# Patient Record
Sex: MALE | Race: WHITE | URBAN - METROPOLITAN AREA
[De-identification: names, ages, dates, MRNs, and addresses within clinical notes are randomized per-mention and may not be internally consistent; named-entity substitution may affect disease eponyms.]

---

## 2017-12-14 ENCOUNTER — RADIANT APPOINTMENT (OUTPATIENT)
Dept: GENERAL RADIOLOGY | Facility: CLINIC | Age: 47
End: 2017-12-14
Attending: FAMILY MEDICINE
Payer: COMMERCIAL

## 2017-12-14 ENCOUNTER — OFFICE VISIT (OUTPATIENT)
Dept: ORTHOPEDICS | Facility: CLINIC | Age: 47
End: 2017-12-14
Payer: COMMERCIAL

## 2017-12-14 ENCOUNTER — NURSE TRIAGE (OUTPATIENT)
Dept: NURSING | Facility: CLINIC | Age: 47
End: 2017-12-14

## 2017-12-14 ENCOUNTER — OFFICE VISIT (OUTPATIENT)
Dept: URGENT CARE | Facility: URGENT CARE | Age: 47
End: 2017-12-14
Payer: COMMERCIAL

## 2017-12-14 VITALS
DIASTOLIC BLOOD PRESSURE: 79 MMHG | OXYGEN SATURATION: 98 % | HEART RATE: 44 BPM | WEIGHT: 190 LBS | TEMPERATURE: 98.3 F | SYSTOLIC BLOOD PRESSURE: 153 MMHG

## 2017-12-14 VITALS — HEIGHT: 70 IN | BODY MASS INDEX: 27.2 KG/M2 | HEART RATE: 66 BPM | WEIGHT: 190 LBS

## 2017-12-14 DIAGNOSIS — S49.91XA SHOULDER INJURY, RIGHT, INITIAL ENCOUNTER: ICD-10-CM

## 2017-12-14 DIAGNOSIS — S42.024A CLOSED NONDISPLACED FRACTURE OF SHAFT OF RIGHT CLAVICLE, INITIAL ENCOUNTER: Primary | ICD-10-CM

## 2017-12-14 DIAGNOSIS — S42.031A CLOSED DISPLACED FRACTURE OF ACROMIAL END OF RIGHT CLAVICLE, INITIAL ENCOUNTER: Primary | ICD-10-CM

## 2017-12-14 PROCEDURE — 99203 OFFICE O/P NEW LOW 30 MIN: CPT | Performed by: FAMILY MEDICINE

## 2017-12-14 PROCEDURE — 99203 OFFICE O/P NEW LOW 30 MIN: CPT | Performed by: ORTHOPAEDIC SURGERY

## 2017-12-14 PROCEDURE — 73000 X-RAY EXAM OF COLLAR BONE: CPT | Mod: RT

## 2017-12-14 PROCEDURE — 73030 X-RAY EXAM OF SHOULDER: CPT | Mod: RT

## 2017-12-14 RX ORDER — HYDROCODONE BITARTRATE AND ACETAMINOPHEN 5; 325 MG/1; MG/1
1-2 TABLET ORAL EVERY 6 HOURS PRN
Qty: 20 TABLET | Refills: 0 | Status: SHIPPED | OUTPATIENT
Start: 2017-12-14 | End: 2018-02-07

## 2017-12-14 RX ORDER — HYDROMORPHONE HYDROCHLORIDE 2 MG/1
2-4 TABLET ORAL EVERY 6 HOURS PRN
Qty: 40 TABLET | Refills: 0 | Status: SHIPPED | OUTPATIENT
Start: 2017-12-14 | End: 2018-02-07

## 2017-12-14 RX ORDER — IBUPROFEN 800 MG/1
800 TABLET, FILM COATED ORAL EVERY 8 HOURS PRN
Qty: 60 TABLET | Refills: 0 | Status: SHIPPED | OUTPATIENT
Start: 2017-12-14

## 2017-12-14 NOTE — MR AVS SNAPSHOT
"              After Visit Summary   2017    Enoch Fortune Sr.    MRN: [id number]           Patient Information     Date Of Birth          1970        Visit Information        Provider Department      2017 3:40 PM Alfonzo Alvarez MD [facility]        Today's Diagnoses     Closed nondisplaced fracture of shaft of right clavicle, initial encounter    -  1       Follow-ups after your visit        Who to contact     If you have questions or need follow up information about today's clinic visit or your schedule please contact [facility] directly at [phone].  Normal or non-critical lab and imaging results will be communicated to you by MessageMehart, letter or phone within 4 business days after the clinic has received the results. If you do not hear from us within 7 days, please contact the clinic through Sandglazt or phone. If you have a critical or abnormal lab result, we will notify you by phone as soon as possible.  Submit refill requests through [employer] or call your pharmacy and they will forward the refill request to us. Please allow 3 business days for your refill to be completed.          Additional Information About Your Visit        MyChart Information     [employer] lets you send messages to your doctor, view your test results, renew your prescriptions, schedule appointments and more. To sign up, go to [email or web address] . Click on \"Log in\" on the left side of the screen, which will take you to the Welcome page. Then click on \"Sign up Now\" on the right side of the page.     You will be asked to enter the access code listed below, as well as some personal information. Please follow the directions to create your username and password.     Your access code is: [email or web address]  Expires: 3/14/2018  2:07 PM     Your access code will  in 90 days. If you need help or a new code, please call your [facility] clinic or [phone].        Care " "EveryWhere ID     This is your Care EveryWhere ID. This could be used by other organizations to access your [facility] medical records  [id number]        Your Vitals Were     Pulse Height BMI (Body Mass Index)             66 5' 10\" (1.778 m) 27.26 kg/m2          Blood Pressure from Last 3 Encounters:   12/14/17 153/79    Weight from Last 3 Encounters:   12/14/17 190 lb (86.2 kg)   12/14/17 190 lb (86.2 kg)              Today, you had the following     No orders found for display         Today's Medication Changes          These changes are accurate as of: 12/14/17 11:59 PM.  If you have any questions, ask your nurse or doctor.               Start taking these medicines.        Dose/Directions    HYDROcodone-acetaminophen 5-325 MG per tablet   Commonly known as:  NORCO   Used for:  Closed displaced fracture of acromial end of right clavicle, initial encounter   Started by:  Trevor Moon MD        Dose:  1-2 tablet   Take 1-2 tablets by mouth every 6 hours as needed for moderate to severe pain   Quantity:  20 tablet   Refills:  0       HYDROmorphone 2 MG tablet   Commonly known as:  DILAUDID   Used for:  Closed nondisplaced fracture of shaft of right clavicle, initial encounter   Started by:  Alfonzo Alvarez MD        Dose:  2-4 mg   Take 1-2 tablets (2-4 mg) by mouth every 6 hours as needed for pain maximum 8 tablet(s) per day   Quantity:  40 tablet   Refills:  0       ibuprofen 800 MG tablet   Commonly known as:  ADVIL/MOTRIN   Used for:  Closed displaced fracture of acromial end of right clavicle, initial encounter   Started by:  Trevor Moon MD        Dose:  800 mg   Take 1 tablet (800 mg) by mouth every 8 hours as needed for moderate pain   Quantity:  60 tablet   Refills:  0            Where to get your medicines      These medications were sent to [address] [address] [facility]  [address] [facility] [address]    Hours:  Tech issues with their phone system Phone:  " [phone]     ibuprofen 800 MG tablet         Some of these will need a paper prescription and others can be bought over the counter.  Ask your nurse if you have questions.     Bring a paper prescription for each of these medications     HYDROcodone-acetaminophen 5-325 MG per tablet    HYDROmorphone 2 MG tablet                Primary Care Provider Office Phone # Fax #    [facility] [phone] [address] [facility] [address]        Equal Access to Services     ZION SANDHU : Hadii aad ku hadasho Soomaali, waaxda luqadaha, qaybta kaalmada adeegyada, waxay idiin hayaan adeeg todd pinto. So [facility] [phone].    ATENCIÓN: Si habla español, tiene a mazariegos disposición servicios gratuitos de asistencia lingüística. [facility] [phone].    We comply with applicable federal civil rights laws and [place] laws. We do not discriminate on the basis of race, color, national origin, age, disability, sex, sexual orientation, or gender identity.            Thank you!     Thank you for choosing [facility]  for your care. Our goal is always to provide you with excellent care. Hearing back from our patients is one way we can continue to improve our services. Please take a few minutes to complete the written survey that you may receive in the mail after your visit with us. Thank you!             Your Updated Medication List - Protect others around you: Learn how to safely use, store and throw away your medicines at www.disposemymeds.org.          This list is accurate as of: 12/14/17 11:59 PM.  Always use your most recent med list.                   Brand Name Dispense Instructions for use Diagnosis    HYDROcodone-acetaminophen 5-325 MG per tablet    NORCO    20 tablet    Take 1-2 tablets by mouth every 6 hours as needed for moderate to severe pain    Closed displaced fracture of acromial end of right clavicle, initial encounter       HYDROmorphone 2 MG  tablet    DILAUDID    40 tablet    Take 1-2 tablets (2-4 mg) by mouth every 6 hours as needed for pain maximum 8 tablet(s) per day    Closed nondisplaced fracture of shaft of right clavicle, initial encounter       ibuprofen 800 MG tablet    ADVIL/MOTRIN    60 tablet    Take 1 tablet (800 mg) by mouth every 8 hours as needed for moderate pain    Closed displaced fracture of acromial end of right clavicle, initial encounter

## 2017-12-14 NOTE — PATIENT INSTRUCTIONS
Okay to take ibuprofen 800 mg every 8 hours as needed.  Okay to take norco for worse pain.  Follow up with sports ortho for fracture care.  Wear sling for comfort.  Ice to area.      Collarbone Fracture  You have a break (fracture) in your collarbone (clavicle). This will cause swelling, pain, and bruising. The first few weeks will be the most painful. This is because deep breathing, coughing, or changing position from sitting to lying down may cause the broken ends to move slightly.   The fracture will heal in about 4 to 6 weeks. Most people can return to normal activities in about 3 months. In children, this injury will heal by reshaping the bone back to normal. In adults, a noticeable bump in the bone may remain.  Treatment is with a sling or a special type of arm sling called a shoulder immobilizer. This supports your arm and eases pain.  Home care  Follow these guidelines when caring for yourself or your child at home:    Put an ice pack on the injured area. Do this for 20 minutes every 1 to 2 hours on the first day. You can make an ice pack by wrapping a plastic bag of ice cubes in a thin towel. Keep using the ice pack 3 to 4 times a day for the next 2 days. Then use it as needed to ease pain and swelling.    If you were given a sling or shoulder immobilizer, wear it for comfort. You may take it off when you bathe or sleep. Take your arm out of the sling for a little while each day and move your shoulder, elbow, wrist, and hand to keep them from getting stiff.    Don t do any heavy lifting or raise the injured arm overhead until you are pain-free. Your child shouldn t play sports or do physical education class for at least 4 weeks, or until the healthcare provider says it s OK to do so.    You may use acetaminophen or ibuprofen to control pain, unless another pain medicine was prescribed. If you have chronic liver or kidney disease, talk with your healthcare provider before using these medicines. Also talk  with your provider if you ve had a stomach ulcer or gastrointestinal bleeding.    Your doctor may refer you to physical therapy for shoulder exercises once it starts to heal.    You may need surgery if the bones are out of place (displaced). Surgery will put them in better alignment while they heal. This leads to better strength when you have healed.  Follow-up care  Follow up with your healthcare provider within 1 week, or as advised. This is to be sure the bone is healing the way it should.  X-rays are occasionally taken of the fracture. You will be told of any new findings that may affect your care.  When to seek medical advice  Call your healthcare provider right away if any of these occur:    Swelling in your collarbone gets worse or the skin in the area becomes pale or discolored    Large area of bruising over the collarbone    Fingers become swollen, cold, blue, numb, or tingly    Shortness of breath, dizziness, or general weakness    Weakness or swelling in your arm    Any redness, drainage, or pus coming from the wound  Date Last Reviewed: 1/1/2017 2000-2017 [employer]. [address] [place] [id number]. All rights reserved. This information is not intended as a substitute for professional medical care. Always follow your healthcare professional's instructions.

## 2017-12-14 NOTE — PROGRESS NOTES
HISTORY OF PRESENT ILLNESS:    Enoch Fortune Sr. is a 47 year old RHD male who is seen in consultation at the request of Dr. Moon for right shoulder pain from an injury sustained skiing at [facility] when he fell directly on the tip of the shoulder He went to urgent care where xrays taken showed a fractured clavicle, he was placed in a sling and referred here.    Present symptoms: superior shoulder dull achy pain. Pain scale - 7-8/10 max, 3/10 currently while on Norco  Treatments tried to this point: see above  Orthopedic PMH: None     No past medical history on file.    No past surgical history on file.    No family history on file.    Social History     Social History     Marital status:      Spouse name: N/A     Number of children: N/A     Years of education: N/A     Occupational History     Not on file.     Social History Main Topics     Smoking status: Never Smoker     Smokeless tobacco: Never Used     Alcohol use Not on file     Drug use: Not on file     Sexual activity: Not on file     Other Topics Concern     Not on file     Social History Narrative       Current Outpatient Prescriptions   Medication Sig Dispense Refill     HYDROcodone-acetaminophen (NORCO) 5-325 MG per tablet Take 1-2 tablets by mouth every 6 hours as needed for moderate to severe pain 20 tablet 0     ibuprofen (ADVIL/MOTRIN) 800 MG tablet Take 1 tablet (800 mg) by mouth every 8 hours as needed for moderate pain 60 tablet 0       No Known Allergies    REVIEW OF SYSTEMS:  CONSTITUTIONAL:  NEGATIVE for fever, chills, change in weight  INTEGUMENTARY/SKIN:  NEGATIVE for worrisome rashes, moles or lesions  EYES:  NEGATIVE for vision changes or irritation  ENT/MOUTH:  NEGATIVE for ear, mouth and throat problems  RESP:  NEGATIVE for significant cough or SOB  BREAST:  NEGATIVE for masses, tenderness or discharge  CV:  NEGATIVE for chest pain, palpitations or peripheral edema  GI:  NEGATIVE for nausea, abdominal pain, heartburn, or change in  "bowel habits  :  Negative   MUSCULOSKELETAL:  See HPI above  NEURO:  NEGATIVE for weakness, dizziness or paresthesias  ENDOCRINE:  NEGATIVE for temperature intolerance, skin/hair changes  HEME/ALLERGY/IMMUNE:  NEGATIVE for bleeding problems  PSYCHIATRIC:  NEGATIVE for changes in mood or affect      PHYSICAL EXAM:  Pulse 66  Ht 5' 10\" (1.778 m)  Wt 190 lb (86.2 kg)  BMI 27.26 kg/m2  Body mass index is 27.26 kg/(m^2).   GENERAL APPEARANCE: healthy, alert and no distress   SKIN: no suspicious lesions or rashes  NEURO: Normal strength and tone, mentation intact and speech normal  VASCULAR: Good pulses, and capillary refill   LYMPH: no lymphadenopathy   PSYCH:  mentation appears normal and affect normal/bright    MSK:  Examination of the neck and shoulder girdle musculature reveals no asymmetry, or atrophy to the muscle masses.  There is no erythema, ecchymosis or edema.  There is a normal lordosis to the cervical and lumbar spine regions.  There is a normal kyphosis to the thoracic spine.  There is no clinical evidence of scoliosis. There is no tenderness to palpation or percussion.  There is no paraspinal muscle spasm present.  There is a Normal range of motion to the cervical spine. Forward flexion to 45, extension to 55, R and L lateral bending to 45, and rotation to 70, both R and L.    Strength : Bicep 5/5   C5-6       Sensation :     Deltoid 5/5   C5    Lateral Arm    Wrist extensors 5/5  C6    Thumb    Tricep  5/5   C7    Middle Finger    Finger flexors  5/5  C8    Little Finger    Interossei  5/5   T1    Medial Arm    Reflexes :  Bicep   Symmetric  C5-6    BR Symmetric  C6    Tricep Symmetric  C7    Shoulder:  Examination of the right shoulder reveals a partial active ROM and partial passive ROM.      There is  tenderness to palpation about the AC joint.  There is no tenderness to palpation in the subacromial space.  There is give-way rotator cuff weakness.  Speed's an Chris's Tests for Bicep pathology " are negative. Arm adduction does not cause pain in the AC joint.  Apprehension sign is negative. Scapular winging is not present. ROM of the elbow and wrist is normal and CMS is intact to fingertips.                 ASSESSMENT / PLAN: Right clavicle fracture, treatment for this will be symptomatic. If he still has pain in 2-3 weeks he'll return and we'll recheck the x-ray.        Imaging Interpretation:         Jose Alvarez MD  Department of Orthopedic Surgery

## 2017-12-14 NOTE — MR AVS SNAPSHOT
After Visit Summary   12/14/2017    Enoch Fortune Sr.    MRN: [id number]           Patient Information     Date Of Birth          1970        Visit Information        Provider Department      12/14/2017 1:20 PM Trevor Moon MD [facility] Urgent Care        Today's Diagnoses     Closed displaced fracture of acromial end of right clavicle, initial encounter    -  1    Shoulder injury, right, initial encounter          Care Instructions    Okay to take ibuprofen 800 mg every 8 hours as needed.  Okay to take norco for worse pain.  Follow up with sports ortho for fracture care.  Wear sling for comfort.  Ice to area.      Collarbone Fracture  You have a break (fracture) in your collarbone (clavicle). This will cause swelling, pain, and bruising. The first few weeks will be the most painful. This is because deep breathing, coughing, or changing position from sitting to lying down may cause the broken ends to move slightly.   The fracture will heal in about 4 to 6 weeks. Most people can return to normal activities in about 3 months. In children, this injury will heal by reshaping the bone back to normal. In adults, a noticeable bump in the bone may remain.  Treatment is with a sling or a special type of arm sling called a shoulder immobilizer. This supports your arm and eases pain.  Home care  Follow these guidelines when caring for yourself or your child at home:    Put an ice pack on the injured area. Do this for 20 minutes every 1 to 2 hours on the first day. You can make an ice pack by wrapping a plastic bag of ice cubes in a thin towel. Keep using the ice pack 3 to 4 times a day for the next 2 days. Then use it as needed to ease pain and swelling.    If you were given a sling or shoulder immobilizer, wear it for comfort. You may take it off when you bathe or sleep. Take your arm out of the sling for a little while each day and move your shoulder, elbow, wrist, and hand to keep them from getting  stiff.    Don t do any heavy lifting or raise the injured arm overhead until you are pain-free. Your child shouldn t play sports or do physical education class for at least 4 weeks, or until the healthcare provider says it s OK to do so.    You may use acetaminophen or ibuprofen to control pain, unless another pain medicine was prescribed. If you have chronic liver or kidney disease, talk with your healthcare provider before using these medicines. Also talk with your provider if you ve had a stomach ulcer or gastrointestinal bleeding.    Your doctor may refer you to physical therapy for shoulder exercises once it starts to heal.    You may need surgery if the bones are out of place (displaced). Surgery will put them in better alignment while they heal. This leads to better strength when you have healed.  Follow-up care  Follow up with your healthcare provider within 1 week, or as advised. This is to be sure the bone is healing the way it should.  X-rays are occasionally taken of the fracture. You will be told of any new findings that may affect your care.  When to seek medical advice  Call your healthcare provider right away if any of these occur:    Swelling in your collarbone gets worse or the skin in the area becomes pale or discolored    Large area of bruising over the collarbone    Fingers become swollen, cold, blue, numb, or tingly    Shortness of breath, dizziness, or general weakness    Weakness or swelling in your arm    Any redness, drainage, or pus coming from the wound  Date Last Reviewed: 1/1/2017 2000-2017 [employer]. [address] [place] [address]. All rights reserved. This information is not intended as a substitute for professional medical care. Always follow your healthcare professional's instructions.                Follow-ups after your visit        Additional Services     [facility] is referring you to the Orthopedic  " Services at [facility].       The  Representative will assist you in the coordination of your Orthopedic and Musculoskeletal Care as prescribed by your physician.    The  Representative will call you within 1 business day to help schedule your appointment, or you may contact the  Representative at:    All areas ~ [phone]     Type of Referral : Non Surgical       Timeframe requested: 1 - 2 days    Coverage of these services is subject to the terms and limitations of your health insurance plan.  Please call member services at your health plan with any benefit or coverage questions.      If X-rays, CT or MRI's have been performed, please contact the facility where they were done to arrange for , prior to your scheduled appointment.  Please bring this referral request to your appointment and present it to your specialist.                  Who to contact     If you have questions or need follow up information about today's clinic visit or your schedule please contact [facility] URGENT CARE directly at [phone].  Normal or non-critical lab and imaging results will be communicated to you by [employer]hart, letter or phone within 4 business days after the clinic has received the results. If you do not hear from us within 7 days, please contact the clinic through [employer] or phone. If you have a critical or abnormal lab result, we will notify you by phone as soon as possible.  Submit refill requests through [employer] or call your pharmacy and they will forward the refill request to us. Please allow 3 business days for your refill to be completed.          Additional Information About Your Visit        [employer] Information     [employer] lets you send messages to your doctor, view your test results, renew your prescriptions, schedule appointments and more. To sign up, go to [email or web address] . Click on \"Log in\" on the left side of the screen, which " "will take you to the Welcome page. Then click on \"Sign up Now\" on the right side of the page.     You will be asked to enter the access code listed below, as well as some personal information. Please follow the directions to create your username and password.     Your access code is: [email or web address]  Expires: 3/14/2018  2:07 PM     Your access code will  in 90 days. If you need help or a new code, please call your [place] clinic or [phone].        Care EveryWhere ID     This is your Care EveryWhere ID. This could be used by other organizations to access your [place] medical records  [id number]        Your Vitals Were     Pulse Temperature Pulse Oximetry             44 98.3  F (36.8  C) (Oral) 98%          Blood Pressure from Last 3 Encounters:   17 153/79    Weight from Last 3 Encounters:   17 190 lb (86.2 kg)              We Performed the Following     ORTHO  REFERRAL          Today's Medication Changes          These changes are accurate as of: 17  2:07 PM.  If you have any questions, ask your nurse or doctor.               Start taking these medicines.        Dose/Directions    HYDROcodone-acetaminophen 5-325 MG per tablet   Commonly known as:  NORCO   Used for:  Closed displaced fracture of acromial end of right clavicle, initial encounter   Started by:  Trevor Moon MD        Dose:  1-2 tablet   Take 1-2 tablets by mouth every 6 hours as needed for moderate to severe pain   Quantity:  20 tablet   Refills:  0       ibuprofen 800 MG tablet   Commonly known as:  ADVIL/MOTRIN   Used for:  Closed displaced fracture of acromial end of right clavicle, initial encounter   Started by:  Trevor Moon MD        Dose:  800 mg   Take 1 tablet (800 mg) by mouth every 8 hours as needed for moderate pain   Quantity:  60 tablet   Refills:  0            Where to get your medicines      These medications were sent to [place] [address] [place] [address] " [place], [facility] [address]    Hours:  Tech issues with their phone system Phone:  [phone]     ibuprofen 800 MG tablet         Some of these will need a paper prescription and others can be bought over the counter.  Ask your nurse if you have questions.     Bring a paper prescription for each of these medications     HYDROcodone-acetaminophen 5-325 MG per tablet                Primary Care Provider Office Phone # Fax #    Kimberli Sexton [facility] [phone] [address] [facility] [address]        Equal Access to Services     ZION SANDHU : Hadii sonia ku hadasho Soomaali, waaxda luqadaha, qaybta kaalmada adeegyada, waxay idiin haycricketn al pinto. So [facility] [phone].    ATENCIÓN: Si habla español, tiene a mazariegos disposición servicios gratuitos de asistencia lingüística. Llame al [phone].    We comply with applicable federal civil rights laws and [place] laws. We do not discriminate on the basis of race, color, national origin, age, disability, sex, sexual orientation, or gender identity.            Thank you!     Thank you for choosing [facility] URGENT CARE  for your care. Our goal is always to provide you with excellent care. Hearing back from our patients is one way we can continue to improve our services. Please take a few minutes to complete the written survey that you may receive in the mail after your visit with us. Thank you!             Your Updated Medication List - Protect others around you: Learn how to safely use, store and throw away your medicines at www.disposemymeds.org.          This list is accurate as of: 12/14/17  2:07 PM.  Always use your most recent med list.                   Brand Name Dispense Instructions for use Diagnosis    HYDROcodone-acetaminophen 5-325 MG per tablet    NORCO    20 tablet    Take 1-2 tablets by mouth every 6 hours as needed for moderate to severe pain    Closed displaced fracture of acromial end of right clavicle,  initial encounter       ibuprofen 800 MG tablet    ADVIL/MOTRIN    60 tablet    Take 1 tablet (800 mg) by mouth every 8 hours as needed for moderate pain    Closed displaced fracture of acromial end of right clavicle, initial encounter

## 2017-12-14 NOTE — PROGRESS NOTES
SUBJECTIVE:  Chief Complaint   Patient presents with     Urgent Care     Shoulder Injury     Pt states fell during skiing today  and hurt right shoulder.      Enoch Fortune Sr. is a 47 year old male presents with a chief complaint of right shoulder pain and decreased range of motion.  The injury occurred 1 hours(s) ago.   The injury happened while skiing at [facility]. How: fall, thinks hit a bump and tumbled, ended up falling forward, hitting right shoulder, developed immediate pain.  No head injury, no LOC.   bandaged and made make shift sling, ice to shoulder and took Ibuprofen.  The patient complained of moderate pain  and has had decreased ROM.  Pain exacerbated by movement.  Relieved by nothing.  He treated it initially with ice and Ibuprofen. This is the first time this type of injury has occurred to this patient.     No routine primary provider.  Overall healthy, no chronic medications.  No prior injuries to right arm/shoulder.    No past medical history on file.  No current outpatient prescriptions on file.     Social History   Substance Use Topics     Smoking status: Never Smoker     Smokeless tobacco: Never Used     Alcohol use Not on file       ROS:  CONSTITUTIONAL:NEGATIVE for fever, chills, change in weight  INTEGUMENTARY/SKIN: NEGATIVE for worrisome rashes, moles or lesions  ENT/MOUTH: NEGATIVE for ear, mouth and throat problems  RESP:NEGATIVE for significant cough or SOB  CV: NEGATIVE for chest pain, palpitations or peripheral edema  GI: NEGATIVE for nausea, abdominal pain, heartburn, or change in bowel habits  MUSCULOSKELETAL: POSITIVE  for injury to right shoulder    EXAM:   /79 (BP Location: Left arm, Patient Position: Chair, Cuff Size: Adult Large)  Pulse (!) 44  Temp 98.3  F (36.8  C) (Oral)  Wt 190 lb (86.2 kg)  SpO2 98%  Gen: healthy, alert and mild distress  Extremity: right shoulder has generalized tenderness and decreased ROM, mild discomfort on clavicle distal.   There  is not compromise to the distal circulation.  Pulses are +2 and CRT is brisk  EXTREMITIES: peripheral pulses normal  SKIN: no suspicious lesions or rashes  NEURO: Normal strength and tone, sensory exam grossly normal, mentation intact and speech normal    X-RAY was done - right shoulder and right clavicle -  clavicle with fracture, humerus intact    ASSESSMENT/PLAN:   (S42.031A) Closed displaced fracture of acromial end of right clavicle, initial encounter  (primary encounter diagnosis)  Plan: HYDROcodone-acetaminophen (NORCO) 5-325 MG per         tablet, ibuprofen (ADVIL/MOTRIN) 800 MG tablet,        ORTHO  REFERRAL            (S49.91XA) Shoulder injury, right, initial encounter  Comment: clavicle fracture  Plan: XR Shoulder Right G/E 3 Views, XR Clavicle         Right            Sling for comfort, reviewed symptomatic treatment with rest, ice.  RX ibuprofen 800 mg, RX norco 5/325 mg #20 given for worse pain.  Referral to [facility] for further fracture care.    Return to clinic if any further questions or concerns.    Trevor Moon MD  December 14, 2017 3:08 PM

## 2017-12-14 NOTE — NURSING NOTE
Chief Complaint   Patient presents with     Urgent Care     Shoulder Injury     Pt states fell during skiing today  and hurt right shoulder.        Initial /79 (BP Location: Left arm, Patient Position: Chair, Cuff Size: Adult Large)  Pulse (!) 44  Temp 98.3  F (36.8  C) (Oral)  Wt 190 lb (86.2 kg)  SpO2 98% There is no height or weight on file to calculate BMI.  Medication Reconciliation: unable or not appropriate to perform   Maranda Fitzgerald CMA (SHIRLEY) 12/14/2017 1:35 PM

## 2017-12-15 NOTE — TELEPHONE ENCOUNTER
Additional Information    Pharmacy calling with prescription question and triager answers question    Protocols used: MEDICATION QUESTION CALL-ADULT-  Pharmacy calling to check on the pain medication prescription Dilaudid.  Prescribing MD's notes read to pharmacist.  Ibis Chambers RN  [facility]

## 2018-02-07 ENCOUNTER — OFFICE VISIT (OUTPATIENT)
Dept: ORTHOPEDICS | Facility: CLINIC | Age: 48
End: 2018-02-07
Payer: COMMERCIAL

## 2018-02-07 VITALS
SYSTOLIC BLOOD PRESSURE: 128 MMHG | HEIGHT: 69 IN | WEIGHT: 178 LBS | DIASTOLIC BLOOD PRESSURE: 70 MMHG | BODY MASS INDEX: 26.36 KG/M2

## 2018-02-07 DIAGNOSIS — S42.021D CLOSED DISPLACED FRACTURE OF SHAFT OF RIGHT CLAVICLE WITH ROUTINE HEALING, SUBSEQUENT ENCOUNTER: Primary | ICD-10-CM

## 2018-02-07 PROCEDURE — 99213 OFFICE O/P EST LOW 20 MIN: CPT | Performed by: ORTHOPAEDIC SURGERY

## 2018-02-07 NOTE — PROGRESS NOTES
"HISTORY OF PRESENT ILLNESS:    Enoch Fortune Sr. is a 47 year old male who is seen in follow up for right clavicle fracture.  Present symptoms: DOI: 12/14/2017 ~ 8 weeks ago.  Pt states he continues to have some pain with the shoulder and some limitations with his ROM.  Treatments tried to this point: ibuprofen, norco, dilaudid, ice, stretching    PHYSICAL EXAM:  /70 (BP Location: Right arm, Patient Position: Sitting, Cuff Size: Adult Regular)  Ht 5' 9.1\" (1.755 m)  Wt 178 lb (80.7 kg)  BMI 26.21 kg/m2  Body mass index is 26.21 kg/(m^2).   GENERAL APPEARANCE: healthy, alert and no distress   SKIN: no suspicious lesions or rashes  NEURO: Normal strength and tone, mentation intact and speech normal  VASCULAR:  good pulses, and cappillary refill   LYMPH: no lymphadenopathy   PSYCH:  mentation appears normal and affect normal/bright    MSK:  Examination of the neck and shoulder girdle musculature reveals no asymmetry, or atrophy to the muscle masses.  There is no erythema, ecchymosis or edema.  There is a normal lordosis to the cervical and lumbar spine regions.  There is a normal kyphosis to the thoracic spine.  There is no clinical evidence of scoliosis. There is no tenderness to palpation or percussion.  There is no paraspinal muscle spasm present.  There is a Normal range of motion to the cervical spine. Forward flexion to 45, extension to 55, R and L lateral bending to 45, and rotation to 70, both R and L.    Strength : Bicep 5/5   C5-6       Sensation :     Deltoid 5/5   C5    Lateral Arm    Wrist extensors 5/5  C6    Thumb    Tricep  5/5   C7    Middle Finger    Finger flexors  5/5  C8    Little Finger    Interossei  5/5   T1    Medial Arm    Reflexes :  Bicep   Symmetric  C5-6    BR Symmetric  C6    Tricep Symmetric  C7    Shoulder:  Examination of the right shoulder reveals a partial active ROM and full passive ROM.    Forward Flexion : 180 degrees Pain  YES --   Abduction  :  180 degrees  YES -- "   Internal Rotation : lumbar 5 YES --  Subscap Lift-off test negative  External Rotation :    0 Deg-90  90 Deg- 90 Contralateral side symmetric    There is no tenderness to palpation about the AC joint, anterior capsule or Bicep tendon.  There is no tenderness to palpation in the subacromial space.  There is give-way rotator cuff weakness.  Speed's an Yergason's Tests for Bicep pathology are negative. Arm adduction does not cause pain in the AC joint.  Apprehension sign is negative. Scapular winging is not present. ROM of the elbow and wrist is normal and CMS is intact to fingertips.        IMAGING INTERPRETATION:       ASSESSMENT / PLAN: Healing right midshaft clavicle fracture. I told him that at this point the healing is going to be slow for the next 4-6 weeks. He is going to maintain range of motion of the shoulder.    Return to clinic keiry Alvarez MD  Dept. Orthopedic Surgery  [facility]       Disclaimer: This note consists of symbols derived from keyboarding, dictation and/or voice recognition software. As a result, there may be errors in the script that have gone undetected. Please consider this when interpreting information found in this chart.

## 2018-02-07 NOTE — NURSING NOTE
"Chief Complaint   Patient presents with     Follow Up For     Right clavicle fracture - DOI: 12/14/2017       Initial /70 (BP Location: Right arm, Patient Position: Sitting, Cuff Size: Adult Regular)  Ht 5' 9.1\" (1.755 m)  Wt 178 lb (80.7 kg)  BMI 26.21 kg/m2 Estimated body mass index is 26.21 kg/(m^2) as calculated from the following:    Height as of this encounter: 5' 9.1\" (1.755 m).    Weight as of this encounter: 178 lb (80.7 kg).  Medication Reconciliation: complete    "

## 2018-02-07 NOTE — MR AVS SNAPSHOT
After Visit Summary   2/7/2018    Enoch Fortune Sr.    MRN: [id number]           Patient Information     Date Of Birth          1970        Visit Information        Provider Department      2/7/2018 3:40 PM Alfonzo Alvarez MD [facility] ORTHOPEDIC SURGERY        Today's Diagnoses     Closed displaced fracture of shaft of right clavicle with routine healing, subsequent encounter    -  1       Follow-ups after your visit        Additional Services     [facility] PT, HAND, AND CHIROPRACTIC REFERRAL       **This order will print in the [facility] Scheduling Office**    Physical Therapy, Hand Therapy and Chiropractic Care are available through:    *[facility]  *[facility]  *[facility]    Call one number to schedule at any of the above locations: [phone].    Your provider has referred you to: Physical Therapy at [facility] or [facility]    Indication/Reason for Referral: Shoulder Pain - R clavicle fracture  Onset of Illness: DOI: 12/14/2017  Therapy Orders: Evaluate and Treat  Special Programs: None  Special Request: None    Flori Ruby      Additional Comments for the Therapist or Chiropractor: ROM exercises    Please be aware that coverage of these services is subject to the terms and limitations of your health insurance plan.  Call member services at your health plan with any benefit or coverage questions.      Please bring the following to your appointment:    *Your personal calendar for scheduling future appointments  *Comfortable clothing                  Your next 10 appointments already scheduled     Feb 28, 2018 10:10 AM [facility] Extremity with April Knott PT   [facility] [place] ([facility])    [address] [facility] [address] [phone]              Who to contact     If you have questions or need follow up information about today's clinic visit or your schedule please contact [facility]  "ORTHOPEDIC SURGERY directly at [phone].  Normal or non-critical lab and imaging results will be communicated to you by MyChart, letter or phone within 4 business days after the clinic has received the results. If you do not hear from us within 7 days, please contact the clinic through Jellynotehart or phone. If you have a critical or abnormal lab result, we will notify you by phone as soon as possible.  Submit refill requests through [employer] or call your pharmacy and they will forward the refill request to us. Please allow 3 business days for your refill to be completed.          Additional Information About Your Visit        [employer] Information     [employer] lets you send messages to your doctor, view your test results, renew your prescriptions, schedule appointments and more. To sign up, go to [email or web address] . Click on \"Log in\" on the left side of the screen, which will take you to the Welcome page. Then click on \"Sign up Now\" on the right side of the page.     You will be asked to enter the access code listed below, as well as some personal information. Please follow the directions to create your username and password.     Your access code is: [email or web address]  Expires: 3/14/2018  2:07 PM     Your access code will  in 90 days. If you need help or a new code, please call your [place] clinic or [phone].        Care EveryWhere ID     This is your Care EveryWhere ID. This could be used by other organizations to access your [place] medical records  [id number]        Your Vitals Were     Height BMI (Body Mass Index)                5' 9.1\" (1.755 m) 26.21 kg/m2           Blood Pressure from Last 3 Encounters:   18 128/70   17 153/79    Weight from Last 3 Encounters:   18 178 lb (80.7 kg)   17 190 lb (86.2 kg)   17 190 lb (86.2 kg)              We Performed the Following     [facility] PT, HAND, AND CHIROPRACTIC REFERRAL        Primary Care Provider Office Phone # Fax #    Kimberli " [facility] [phone] [address] [facility] [address]        Equal Access to Services     ZION SANDHU : Hadii aad ku hadangusrin Adeola, wasanchoda randalhanane, debbieta kakwadwoda mallory, penny lerner eribertosebas brooks laLeoniealicia blair. So [facility] [phone].    ATENCIÓN: Si habla español, tiene a mazariegos disposición servicios gratuitos de asistencia lingüística. Llame al [phone].    We comply with applicable federal civil rights laws and [place] laws. We do not discriminate on the basis of race, color, national origin, age, disability, sex, sexual orientation, or gender identity.            Thank you!     Thank you for choosing [facility] ORTHOPEDIC SURGERY  for your care. Our goal is always to provide you with excellent care. Hearing back from our patients is one way we can continue to improve our services. Please take a few minutes to complete the written survey that you may receive in the mail after your visit with us. Thank you!             Your Updated Medication List - Protect others around you: Learn how to safely use, store and throw away your medicines at www.disposemymeds.org.          This list is accurate as of 2/7/18 11:59 PM.  Always use your most recent med list.                   Brand Name Dispense Instructions for use Diagnosis    ibuprofen 800 MG tablet    ADVIL/MOTRIN    60 tablet    Take 1 tablet (800 mg) by mouth every 8 hours as needed for moderate pain    Closed displaced fracture of acromial end of right clavicle, initial encounter

## 2018-02-14 ENCOUNTER — THERAPY VISIT (OUTPATIENT)
Dept: PHYSICAL THERAPY | Facility: CLINIC | Age: 48
End: 2018-02-14
Payer: COMMERCIAL

## 2018-02-14 DIAGNOSIS — M25.511 RIGHT SHOULDER PAIN: Primary | ICD-10-CM

## 2018-02-14 PROCEDURE — 97110 THERAPEUTIC EXERCISES: CPT | Mod: GP | Performed by: PHYSICAL THERAPIST

## 2018-02-14 PROCEDURE — 97161 PT EVAL LOW COMPLEX 20 MIN: CPT | Mod: GP | Performed by: PHYSICAL THERAPIST

## 2018-02-14 NOTE — PROGRESS NOTES
[facility] Initial Evaluation  Subjective:  Patient is a 47 year old male presenting with rehab right shoulder hpi.   Enoch Fortune Sr. is a 47 year old male with a right shoulder condition.      This is a new condition  Patient has chief complaint of right posterior shoulder and upper arm pain s/p clavicle fracture on 12/18/2017 while downhill skiing. He was in a sling x 2 weeks and now has minimal clavicle pain. He has moderate pain and severe loss of ROM and strength in right shoulder, and complains of increased pain in posterior & lateral shoulder with active movement. He has difficulty reaching in any direction. Patient is right handed.    Patient reports pain:  Lateral, scapular area and upper arm.  Radiates to:  Upper arm, elbow and cervical.   and is constant and reported as 6/10 and 2/10.  Associated symptoms:  Loss of motion/stiffness, catching and loss of strength. Pain is the same all the time.  Symptoms are exacerbated by using arm at shoulder level, using arm overhead, lifting and using arm behind back and relieved by NSAID's, ice and rest.  Since onset symptoms are unchanged.        General health as reported by patient is excellent.                                              Objective:  System                   Shoulder Evaluation:  ROM:  AROM:    Flexion:  Right:  82  Extension: Right: 10  Abduction:  Right:  82                      PROM:    Flexion:  Right: 82    Extension:  Right:  10  Abduction:  Right:  80    Internal Rotation:  Right:  35  External Rotation:  Right:  -20      Elbow Flexion:  Right:  WNL  Elbow Extension:  Right:  WNL            Strength:    Flexion: Right: 3-/5      Pain:  ++  Extension:  Right: 3+/5     Pain:+  Abduction:  Right: 3+/5      Pain:++    Internal Rotation:  Right: 3/5      Pain:++  External Rotation:   Right:2+/5      Pain:++        Elbow Flexion:  Right:4/5      Pain:+  Elbow Extension:  Right:4+/5      Pain:-/+    Special Tests:       Right shoulder positive for the following special tests:Impingement and Rotator cuff tear  Palpation:      Right shoulder tenderness present at: Deltoid; Rhomboids and Upper Trap                                     General     ROS    Assessment/Plan:    Patient is a 47 year old male with right side shoulder complaints.    Patient has the following significant findings with corresponding treatment plan.                Diagnosis 1:  Right shoulder pain  Pain -  hot/cold therapy, education and home program  Decreased ROM/flexibility - manual therapy, therapeutic exercise and home program  Decreased joint mobility - manual therapy, therapeutic exercise and home program  Decreased strength - therapeutic exercise, therapeutic activities and home program    Therapy Evaluation Codes:   1) History comprised of:   Personal factors that impact the plan of care:      None.    Comorbidity factors that impact the plan of care are:      None.     Medications impacting care: None.  2) Examination of Body Systems comprised of:   Body structures and functions that impact the plan of care:      Shoulder.   Activity limitations that impact the plan of care are:      Dressing, Lifting and Sleeping.  3) Clinical presentation characteristics are:   Stable/Uncomplicated.  4) Decision-Making    Low complexity using standardized patient assessment instrument and/or measureable assessment of functional outcome.  Cumulative Therapy Evaluation is: Low complexity.    Previous and current functional limitations:  (See Goal Flow Sheet for this information)    Short term and Long term goals: (See Goal Flow Sheet for this information)     Communication ability:  Patient appears to be able to clearly communicate and understand verbal and written communication and follow directions correctly.  Treatment Explanation - The following has been discussed with the patient:   RX ordered/plan of care  Anticipated outcomes  Possible risks and side  effects  This patient would benefit from PT intervention to resume normal activities.      Frequency:  1 X week, once daily  Duration:  for 8 weeks  Discharge Plan:  Achieve all LTG.  Independent in home treatment program.  Reach maximal therapeutic benefit.    Please refer to the daily flowsheet for treatment today, total treatment time and time spent performing 1:1 timed codes.

## 2018-02-28 ENCOUNTER — THERAPY VISIT (OUTPATIENT)
Dept: PHYSICAL THERAPY | Facility: CLINIC | Age: 48
End: 2018-02-28
Payer: COMMERCIAL

## 2018-02-28 DIAGNOSIS — M25.511 RIGHT SHOULDER PAIN: ICD-10-CM

## 2018-02-28 PROCEDURE — 97110 THERAPEUTIC EXERCISES: CPT | Mod: GP | Performed by: PHYSICAL THERAPIST

## 2018-02-28 PROCEDURE — 97140 MANUAL THERAPY 1/> REGIONS: CPT | Mod: GP | Performed by: PHYSICAL THERAPIST

## 2018-03-07 ENCOUNTER — THERAPY VISIT (OUTPATIENT)
Dept: PHYSICAL THERAPY | Facility: CLINIC | Age: 48
End: 2018-03-07
Payer: COMMERCIAL

## 2018-03-07 DIAGNOSIS — M25.511 RIGHT SHOULDER PAIN: ICD-10-CM

## 2018-03-07 PROCEDURE — 97140 MANUAL THERAPY 1/> REGIONS: CPT | Mod: GP | Performed by: PHYSICAL THERAPIST

## 2018-03-07 PROCEDURE — 97110 THERAPEUTIC EXERCISES: CPT | Mod: GP | Performed by: PHYSICAL THERAPIST

## 2018-03-14 ENCOUNTER — OFFICE VISIT (OUTPATIENT)
Dept: ORTHOPEDICS | Facility: CLINIC | Age: 48
End: 2018-03-14
Payer: COMMERCIAL

## 2018-03-14 ENCOUNTER — RADIANT APPOINTMENT (OUTPATIENT)
Dept: GENERAL RADIOLOGY | Facility: CLINIC | Age: 48
End: 2018-03-14
Attending: ORTHOPAEDIC SURGERY
Payer: COMMERCIAL

## 2018-03-14 ENCOUNTER — THERAPY VISIT (OUTPATIENT)
Dept: PHYSICAL THERAPY | Facility: CLINIC | Age: 48
End: 2018-03-14
Payer: COMMERCIAL

## 2018-03-14 ENCOUNTER — TRANSFERRED RECORDS (OUTPATIENT)
Dept: HEALTH INFORMATION MANAGEMENT | Facility: CLINIC | Age: 48
End: 2018-03-14

## 2018-03-14 VITALS
HEIGHT: 69 IN | DIASTOLIC BLOOD PRESSURE: 82 MMHG | SYSTOLIC BLOOD PRESSURE: 124 MMHG | WEIGHT: 178 LBS | BODY MASS INDEX: 26.36 KG/M2

## 2018-03-14 DIAGNOSIS — M25.511 RIGHT SHOULDER PAIN: ICD-10-CM

## 2018-03-14 DIAGNOSIS — M25.511 ACUTE PAIN OF RIGHT SHOULDER: ICD-10-CM

## 2018-03-14 DIAGNOSIS — M75.101 TEAR OF RIGHT ROTATOR CUFF, UNSPECIFIED TEAR EXTENT: ICD-10-CM

## 2018-03-14 DIAGNOSIS — M25.511 ACUTE PAIN OF RIGHT SHOULDER: Primary | ICD-10-CM

## 2018-03-14 PROCEDURE — 73030 X-RAY EXAM OF SHOULDER: CPT | Mod: RT

## 2018-03-14 PROCEDURE — 99213 OFFICE O/P EST LOW 20 MIN: CPT | Performed by: ORTHOPAEDIC SURGERY

## 2018-03-14 PROCEDURE — 97140 MANUAL THERAPY 1/> REGIONS: CPT | Mod: GP | Performed by: PHYSICAL THERAPIST

## 2018-03-14 PROCEDURE — 97110 THERAPEUTIC EXERCISES: CPT | Mod: GP | Performed by: PHYSICAL THERAPIST

## 2018-03-14 NOTE — NURSING NOTE
"Chief Complaint   Patient presents with     Follow Up For     Right clavicle fracture - DOI: 12/14/2017       Initial /82 (BP Location: Right arm, Patient Position: Sitting, Cuff Size: Adult Regular)  Ht 5' 9.1\" (1.755 m)  Wt 178 lb (80.7 kg)  BMI 26.21 kg/m2 Estimated body mass index is 26.21 kg/(m^2) as calculated from the following:    Height as of this encounter: 5' 9.1\" (1.755 m).    Weight as of this encounter: 178 lb (80.7 kg).  Medication Reconciliation: complete    "

## 2018-03-14 NOTE — PROGRESS NOTES
Subjective:  HPI                    Objective:  System    Physical Exam    General     ROS    Assessment/Plan:    PROGRESS  REPORT    Progress reporting period is from 2/14/2018 to 3/14/2018.     SUBJECTIVE    Subjective: Minimal improvement noted since starting PT. Some increase in passive ROM, but no improvement in strength or function. He reports some clicking and shooting pain into upper arm and elbow with attempts at lifting arm.   Current Pain level: 5/10   Initial Pain level: 6/10   Changes in function: No changes noted in function since last SOAP note     OBJECTIVE  Objective: Right shoulder:PROM: [id number]; ER=0; abduction=80;extension=40;IR=45;                    Active ROM: flexion=80; strength: external rotation=2/5;IR=2/5;flexion=2+/5;extension=4-/5;abduction=2/5.                    +impingement and rotator cuff tear tests      ASSESSMENT/PLAN  Updated problem list and treatment plan: Diagnosis 1:  Right shoulder pain  Pain -  hot/cold therapy and manual therapy  Decreased ROM/flexibility - manual therapy, therapeutic exercise and home program  Decreased strength - therapeutic exercise, therapeutic activities and home program  STG/LTGs have been met or progress has been made towards goals:  Improved ROM   Assessment of Progress: slight improvement in ROM, no increase in strength. Suspect rotator cuff tear  Self Management Plans:  Patient has been instructed in a home treatment program.  The patient is returning to your office for a recheck appointment.    Recommendations:  This patient would benefit from further evaluation.  Patient would benefit from the following:  Further testing of right shoulder        Please refer to the daily flowsheet for treatment today, total treatment time and time spent performing 1:1 timed codes.

## 2018-03-14 NOTE — MR AVS SNAPSHOT
After Visit Summary   3/14/2018    Enoch Fortune Sr.    MRN: [id number]           Patient Information     Date Of Birth          1970        Visit Information        Provider Department      3/14/2018 3:00 PM Alfonzo Alvarez MD [facility] ORTHOPEDIC SURGERY        Today's Diagnoses     Acute pain of right shoulder    -  1    Tear of right rotator cuff, unspecified tear extent          Care Instructions    Please call [facility] [phone] ([address], [address], [place], [place]) to schedule your appointment if you have not heard from them in two business days    Please follow up in clinic 2-3 business days following the MRI. Please call [phone] to schedule your follow up appointment.                  Follow-ups after your visit        Your next 10 appointments already scheduled     Mar 21, 2018 10:10 AM CDT   SANDRA Extremity with April Knott PT   [facility]    [address] [id number] [phone]            Mar 28, 2018 10:10 AM CDT   SANDRA Extremity with April Knott PT   [facility]    [address] [id number] [phone]              Future tests that were ordered for you today     Open Future Orders        Priority Expected Expires Ordered    MR Shoulder Right w/o Contrast Routine  3/14/2019 3/14/2018            Who to contact     If you have questions or need follow up information about today's clinic visit or your schedule please contact [facility] ORTHOPEDIC SURGERY directly at [phone].  Normal or non-critical lab and imaging results will be communicated to you by MyChart, letter or phone within 4 business days after the clinic has received the results. If you do not hear from us within 7 days, please contact the clinic through MyChart or phone. If you have a critical or abnormal lab result, we will  "notify you by phone as soon as possible.  Submit refill requests through [employer] or call your pharmacy and they will forward the refill request to us. Please allow 3 business days for your refill to be completed.          Additional Information About Your Visit        [employer] Backup Information     [employer] lets you send messages to your doctor, view your test results, renew your prescriptions, schedule appointments and more. To sign up, go to [email or web address] . Click on \"Log in\" on the left side of the screen, which will take you to the Welcome page. Then click on \"Sign up Now\" on the right side of the page.     You will be asked to enter the access code listed below, as well as some personal information. Please follow the directions to create your username and password.     Your access code is: [email or web address]  Expires: 2018  3:32 PM     Your access code will  in 90 days. If you need help or a new code, please call your [facility] clinic or [phone].        Care EveryWhere ID     This is your Care EveryWhere ID. This could be used by other organizations to access your [facility] medical records  [id number]        Your Vitals Were     Height BMI (Body Mass Index)                5' 9.1\" (1.755 m) 26.21 kg/m2           Blood Pressure from Last 3 Encounters:   18 124/82   18 128/70   17 153/79    Weight from Last 3 Encounters:   18 178 lb (80.7 kg)   18 178 lb (80.7 kg)   17 190 lb (86.2 kg)               Primary Care Provider Office Phone # Fax #    [facility] [phone] [address] [address]        Equal Access to Services     ZION SANDHU : Wendy Mir, waandreina mcclellan, qaybta kaalmaromana tejada, penny pinto. So [facility] [phone].    ATENCIÓN: Si habla español, tiene a mazariegos disposición servicios gratuitos de asistencia lingüística. Manny mcmillan [phone].    We comply " with applicable federal civil rights laws and [place] laws. We do not discriminate on the basis of race, color, national origin, age, disability, sex, sexual orientation, or gender identity.            Thank you!     Thank you for choosing [facility] ORTHOPEDIC SURGERY  for your care. Our goal is always to provide you with excellent care. Hearing back from our patients is one way we can continue to improve our services. Please take a few minutes to complete the written survey that you may receive in the mail after your visit with us. Thank you!             Your Updated Medication List - Protect others around you: Learn how to safely use, store and throw away your medicines at www.disposemymeds.org.          This list is accurate as of 3/14/18  3:32 PM.  Always use your most recent med list.                   Brand Name Dispense Instructions for use Diagnosis    ibuprofen 800 MG tablet    ADVIL/MOTRIN    60 tablet    Take 1 tablet (800 mg) by mouth every 8 hours as needed for moderate pain    Closed displaced fracture of acromial end of right clavicle, initial encounter

## 2018-03-14 NOTE — PROGRESS NOTES
"HISTORY OF PRESENT ILLNESS:    Enoch Fortune Sr. is a 47 year old male who is seen in follow up for right clavicle fracture.  Present symptoms: DOI: 12/14/17 ~ 3 months ago.  Pt states he got some motion back going to physical therapy, pt is lacking motion - ER and reaching overhead, will get shooting pain in the arm, can not lift with the right arm.  Treatments tried to this point: ibuprofen, norco, dilaudid, ice, stretching, physical therapy    PHYSICAL EXAM:  /82 (BP Location: Right arm, Patient Position: Sitting, Cuff Size: Adult Regular)  Ht 5' 9.1\" (1.755 m)  Wt 178 lb (80.7 kg)  BMI 26.21 kg/m2  Body mass index is 26.21 kg/(m^2).   GENERAL APPEARANCE: healthy, alert and no distress   SKIN: no suspicious lesions or rashes  NEURO: Normal strength and tone, mentation intact and speech normal  VASCULAR:  good pulses, and cappillary refill   LYMPH: no lymphadenopathy   PSYCH:  mentation appears normal and affect normal/bright    MSK:  Examination of the neck and shoulder girdle musculature reveals no asymmetry, or atrophy to the muscle masses.  There is no erythema, ecchymosis or edema.  There is a normal lordosis to the cervical and lumbar spine regions.  There is a normal kyphosis to the thoracic spine.  There is no clinical evidence of scoliosis. There is no tenderness to palpation or percussion.  There is no paraspinal muscle spasm present.  There is a Normal range of motion to the cervical spine. Forward flexion to 45, extension to 55, R and L lateral bending to 45, and rotation to 70, both R and L.    Strength : Bicep 5/5   C5-6       Sensation :     Deltoid 5/5   C5    Lateral Arm    Wrist extensors 5/5  C6    Thumb    Tricep  5/5   C7    Middle Finger    Finger flexors  5/5  C8    Little Finger    Interossei  5/5   T1    Medial Arm    Reflexes :  Bicep   Symmetric  C5-6    BR Symmetric  C6    Tricep Symmetric  C7    Shoulder:  Examination of the right shoulder reveals a partial active ROM and " partial passive ROM.    Forward Flexion : 90 degrees Pain  YES --   Abduction  :  70 degrees  YES --   Internal Rotation : none YES --  Subscap Lift-off test negative  External Rotation :    0 Deg-20  90 Deg- not possible Contralateral side asymmetric    There is no tenderness to palpation about the AC joint, anterior capsule or Bicep tendon.  There is mild tenderness to palpation in the subacromial space.  There is rotator cuff weakness.  Speed's an Yergason's Tests for Bicep pathology are negative. Arm adduction does not cause pain in the AC joint.  Apprehension sign is negative. Scapular winging is not present. ROM of the elbow and wrist is normal and CMS is intact to fingertips.        IMAGING INTERPRETATION:  Posterior dislocation of GH joint     ASSESSMENT / PLAN: As above. Reviewing previous films, dislocation has been present since initial injury.  I am referring him to our shoulder specialist for his opinion as to plans moving forward.    Return to clinic Monday to see Dr. Chrissy Alvarez MD  Dept. Orthopedic Surgery  [facility]       Disclaimer: This note consists of symbols derived from keyboarding, dictation and/or voice recognition software. As a result, there may be errors in the script that have gone undetected. Please consider this when interpreting information found in this chart.

## 2018-03-14 NOTE — LETTER
"    3/14/2018         RE: Enoch Fortune Sr.  [address]  [place] [address]        Dear Colleague,    Thank you for referring your patient, Enoch Fortune Sr., to the [facility]. Please see a copy of my visit note below.    HISTORY OF PRESENT ILLNESS:    Enoch Fortune Sr. is a 47 year old male who is seen in follow up for right clavicle fracture.  Present symptoms: DOI: 12/14/17 ~ 3 months ago.  Pt states he got some motion back going to physical therapy, pt is lacking motion - ER and reaching overhead, will get shooting pain in the arm, can not lift with the right arm.  Treatments tried to this point: ibuprofen, norco, dilaudid, ice, stretching, physical therapy    PHYSICAL EXAM:  /82 (BP Location: Right arm, Patient Position: Sitting, Cuff Size: Adult Regular)  Ht 5' 9.1\" (1.755 m)  Wt 178 lb (80.7 kg)  BMI 26.21 kg/m2  Body mass index is 26.21 kg/(m^2).   GENERAL APPEARANCE: healthy, alert and no distress   SKIN: no suspicious lesions or rashes  NEURO: Normal strength and tone, mentation intact and speech normal  VASCULAR:  good pulses, and cappillary refill   LYMPH: no lymphadenopathy   PSYCH:  mentation appears normal and affect normal/bright    MSK:  Examination of the neck and shoulder girdle musculature reveals no asymmetry, or atrophy to the muscle masses.  There is no erythema, ecchymosis or edema.  There is a normal lordosis to the cervical and lumbar spine regions.  There is a normal kyphosis to the thoracic spine.  There is no clinical evidence of scoliosis. There is no tenderness to palpation or percussion.  There is no paraspinal muscle spasm present.  There is a Normal range of motion to the cervical spine. Forward flexion to 45, extension to 55, R and L lateral bending to 45, and rotation to 70, both R and L.    Strength : Bicep 5/5   C5-6       Sensation :     Deltoid 5/5   C5    Lateral Arm    Wrist extensors 5/5  C6    Thumb    Tricep  5/5   C7    Middle " Finger    Finger flexors  5/5  C8    Little Finger    Interossei  5/5   T1    Medial Arm    Reflexes :  Bicep   Symmetric  C5-6    BR Symmetric  C6    Tricep Symmetric  C7    Shoulder:  Examination of the right shoulder reveals a partial active ROM and partial passive ROM.    Forward Flexion : 90 degrees Pain  YES --   Abduction  :  70 degrees  YES --   Internal Rotation : none YES --  Subscap Lift-off test negative  External Rotation :    0 Deg-20  90 Deg- not possible Contralateral side asymmetric    There is no tenderness to palpation about the AC joint, anterior capsule or Bicep tendon.  There is mild tenderness to palpation in the subacromial space.  There is rotator cuff weakness.  Speed's an Yergason's Tests for Bicep pathology are negative. Arm adduction does not cause pain in the AC joint.  Apprehension sign is negative. Scapular winging is not present. ROM of the elbow and wrist is normal and CMS is intact to fingertips.        IMAGING INTERPRETATION:  Posterior dislocation of GH joint     ASSESSMENT / PLAN: As above. Reviewing previous films, dislocation has been present since initial injury.  I am referring him to our shoulder specialist for his opinion as to plans moving forward.    Return to clinic Monday to see Dr. Chrissy Alvarez MD  Dept. Orthopedic Surgery  [facility]       Disclaimer: This note consists of symbols derived from keyboarding, dictation and/or voice recognition software. As a result, there may be errors in the script that have gone undetected. Please consider this when interpreting information found in this chart.        Again, thank you for allowing me to participate in the care of your patient.        Sincerely,        Alfonzo Alvarez MD

## 2018-03-14 NOTE — PATIENT INSTRUCTIONS
Please call [facility] [phone] ([address], [address], [place], [place]) to schedule your appointment if you have not heard from them in two business days    Please follow up in clinic 2-3 business days following the MRI. Please call [phone] to schedule your follow up appointment.

## 2018-03-15 LAB — RADIOLOGIST FLAGS: ABNORMAL

## 2018-03-19 ENCOUNTER — HOSPITAL ENCOUNTER (OUTPATIENT)
Dept: CT IMAGING | Facility: CLINIC | Age: 48
Discharge: HOME | End: 2018-03-19
Attending: ORTHOPAEDIC SURGERY | Admitting: ORTHOPAEDIC SURGERY
Payer: COMMERCIAL

## 2018-03-19 ENCOUNTER — TELEPHONE (OUTPATIENT)
Dept: ORTHOPEDICS | Facility: CLINIC | Age: 48
End: 2018-03-19

## 2018-03-19 DIAGNOSIS — M25.511 ACUTE PAIN OF RIGHT SHOULDER: ICD-10-CM

## 2018-03-19 DIAGNOSIS — M75.101 TEAR OF RIGHT ROTATOR CUFF, UNSPECIFIED TEAR EXTENT: ICD-10-CM

## 2018-03-19 PROCEDURE — 73200 CT UPPER EXTREMITY W/O DYE: CPT | Mod: RT

## 2018-03-19 NOTE — TELEPHONE ENCOUNTER
Patient left voicemail that he had a CT today ordered by Dr. Alvarez. He was told we might have results by this afternoon and wanting to know next steps.     Phone call to patient. Per Dr. Alvarez office visit note, he wants patient to follow up with Dr. Lowe .    Appointment scheduled and he verbalized understanding.     MAGGI Narvaez RN

## 2018-03-21 ENCOUNTER — OFFICE VISIT (OUTPATIENT)
Dept: ORTHOPEDICS | Facility: CLINIC | Age: 48
End: 2018-03-21
Payer: COMMERCIAL

## 2018-03-21 VITALS — HEIGHT: 69 IN | BODY MASS INDEX: 25.92 KG/M2 | WEIGHT: 175 LBS

## 2018-03-21 DIAGNOSIS — S43.021A POSTERIOR DISLOCATION OF RIGHT SHOULDER JOINT, INITIAL ENCOUNTER: Primary | ICD-10-CM

## 2018-03-21 PROCEDURE — 99214 OFFICE O/P EST MOD 30 MIN: CPT | Performed by: ORTHOPAEDIC SURGERY

## 2018-03-21 NOTE — MR AVS SNAPSHOT
"              After Visit Summary   3/21/2018    Enoch Fortune Sr.    MRN: [id number]           Patient Information     Date Of Birth          1970        Visit Information        Provider Department      3/21/2018 8:00 AM Gabe Lowe MD [facility] ORTHOPEDIC SURGERY        Today's Diagnoses     Posterior dislocation of right shoulder joint, initial encounter    -  1      Care Instructions    Since that I will discussed and let us know if  The decision is made to go ahead with the surgery as soon as possible          Follow-ups after your visit        Your next 10 appointments already scheduled     Mar 28, 2018 10:10 AM CDT   [facility]ANDR[facility] Extremity with April Knott PT   [facility]    [address] [facility] [address] [facility] [address] [phone]              Who to contact     If you have questions or need follow up information about today's clinic visit or your schedule please contact [facility] ORTHOPEDIC SURGERY directly at [phone].  Normal or non-critical lab and imaging results will be communicated to you by innocutishart, letter or phone within 4 business days after the clinic has received the results. If you do not hear from us within 7 days, please contact the clinic through innocutishart or phone. If you have a critical or abnormal lab result, we will notify you by phone as soon as possible.  Submit refill requests through Collibra or call your pharmacy and they will forward the refill request to us. Please allow 3 business days for your refill to be completed.          Additional Information About Your Visit        innocutishart Information     Collibra lets you send messages to your doctor, view your test results, renew your prescriptions, schedule appointments and more. To sign up, go to [email or web address] . Click on \"Log in\" on the left side of the screen, which will take you to the Welcome page. Then click on \"Sign up Now\" on the right side of the page. " "    You will be asked to enter the access code listed below, as well as some personal information. Please follow the directions to create your username and password.     Your access code is: [email or web address]  Expires: 2018  3:32 PM     Your access code will  in 90 days. If you need help or a new code, please call your [facility] clinic or [phone].        Care EveryWhere ID     This is your Care EveryWhere ID. This could be used by other organizations to access your [facility] medical records  [id number]        Your Vitals Were     Height BMI (Body Mass Index)                5' 9\" (1.753 m) 25.84 kg/m2           Blood Pressure from Last 3 Encounters:   18 124/82   18 128/70   17 153/79    Weight from Last 3 Encounters:   18 175 lb (79.4 kg)   18 178 lb (80.7 kg)   18 178 lb (80.7 kg)              Today, you had the following     No orders found for display       Primary Care Provider Office Phone # Fax #    [facility] [phone]       [address] [address]        Equal Access to Services     ZION SANDHU AH: Hadii sonia coronado hadasho Soidaniaali, waaxda luqadaha, qaybta kaalmada adeegyada, penny pinto. So [facility] [phone].    ATENCIÓN: Si habla español, tiene a mazariegos disposición servicios gratuitos de asistencia lingüística. Llame al [phone].    We comply with applicable federal civil rights laws and [place] laws. We do not discriminate on the basis of race, color, national origin, age, disability, sex, sexual orientation, or gender identity.            Thank you!     Thank you for choosing [facility] ORTHOPEDIC SURGERY  for your care. Our goal is always to provide you with excellent care. Hearing back from our patients is one way we can continue to improve our services. Please take a few minutes to complete the written survey that you may receive in the mail after your visit with us. Thank " you!             Your Updated Medication List - Protect others around you: Learn how to safely use, store and throw away your medicines at www.disposemymeds.org.          This list is accurate as of 3/21/18  8:55 AM.  Always use your most recent med list.                   Brand Name Dispense Instructions for use Diagnosis    ibuprofen 800 MG tablet    ADVIL/MOTRIN    60 tablet    Take 1 tablet (800 mg) by mouth every 8 hours as needed for moderate pain    Closed displaced fracture of acromial end of right clavicle, initial encounter

## 2018-03-21 NOTE — PROGRESS NOTES
HISTORY OF PRESENT ILLNESS:    Enoch Fortune Sr. is a 47 year old male who is seen in consultation at the request of Dr. Alvarez for right shoulder pain.  DOI: 12/14/17 ~ 3.5 months ago - He was skiing at [employer] and fell on the tip of his shoulder. He was then seen at urgent care for xray and diagnosed with a clavicle. Treated with a sling at that time.  Present symptoms: achy constant pain, AROM limited to shoulder height and below, some shooting pain down the arm. The episodes of shooting pain down the arm has gotten less frequent.  Right numbness or tingling sensation in the fingertips.He is right-hand dominant.      Treatments tried to this point: xrays, physical therapy, CT scan, has seen Dr. Alvarez, HEP, ice, ibuprofen  Orthopedic PMH: none     Past Medical History:   Diagnosis Date     NO ACTIVE PROBLEMS        Past Surgical History:   Procedure Laterality Date     NO HISTORY OF SURGERY         History reviewed. No pertinent family history.    Social History     Social History     Marital status:      Spouse name: N/A     Number of children: N/A     Years of education: N/A     Occupational History     Not on file.     Social History Main Topics     Smoking status: Never Smoker     Smokeless tobacco: Never Used     Alcohol use Not on file     Drug use: Not on file     Sexual activity: Not on file     Other Topics Concern     Not on file     Social History Narrative       Current Outpatient Prescriptions   Medication Sig Dispense Refill     ibuprofen (ADVIL/MOTRIN) 800 MG tablet Take 1 tablet (800 mg) by mouth every 8 hours as needed for moderate pain 60 tablet 0       No Known Allergies    REVIEW OF SYSTEMS:  CONSTITUTIONAL:  NEGATIVE for fever, chills, change in weight  INTEGUMENTARY/SKIN:  NEGATIVE for worrisome rashes, moles or lesions  EYES:  NEGATIVE for vision changes or irritation  ENT/MOUTH:  NEGATIVE for ear, mouth and throat problems  RESP:  NEGATIVE for significant cough or SOB  BREAST:   NEGATIVE for masses, tenderness or discharge  CV:  NEGATIVE for chest pain, palpitations or peripheral edema  GI:  NEGATIVE for nausea, abdominal pain, heartburn, or change in bowel habits  :  Negative   MUSCULOSKELETAL:  See HPI above  NEURO:  NEGATIVE for weakness, dizziness or paresthesias  ENDOCRINE:  NEGATIVE for temperature intolerance, skin/hair changes  HEME/ALLERGY/IMMUNE:  NEGATIVE for bleeding problems  PSYCHIATRIC:  NEGATIVE for changes in mood or affect      PHYSICAL EXAM:  There were no vitals taken for this visit.  There is no height or weight on file to calculate BMI.   GENERAL APPEARANCE: healthy, alert and no distress   HEENT: No apparent thyroid megaly. Clear sclera with normal ocular movement  RESPIRATORY: No labored breathing  SKIN: no suspicious lesions or rashes  NEURO: Normal strength and tone, mentation intact and speech normal  VASCULAR: Good pulses, and capillary refill   LYMPH: no lymphadenopathy   PSYCH:  mentation appears normal and affect normal/bright    MUSCULOSKELETAL:  Normal gait  Mild atrophy of supraspinatus musculature, right compared to left  Axillary nerve sensory function is intact  Elbow range of motion is well maintained  Right shoulder range of motion is very limited with virtually no external rotation  Forward elevation is mostly scapulothoracic movement  Very weak isometric strength of internal rotation and external rotation  skin is intact  No swelling  No tenderness at the fracture site of right clavicle       SSESSMENT:  Chronic locked posterior dislocation, right shoulder  Healing right clavicle fracture    PLAN:  With improvement of pain and absence of tenderness at the fracture site, no further intervention is necessary for the clavicle fracture.  We visualized images of the shoulder x-rays from March 14, 2018 and CT scan images of March 19, 2018.  Nature of posterior dislocation was explained.  Anticipated necessary treatment was explained.  He was informed  that reversed Hill-Sachs lesion needs to be addressed either by  Transfer of Lesser tuberosity or allograft depending on the size. We also talked about the possibility of requiring a resurfacing option.   He was then informed of the fact that he needs to be immobilized with external rotation of the arm for 6 weeks. Most likely, this can be done as an outpatient. Potential risks of infection, loss of motion and neurovascular compromise from the surgery were explained.  He had a question about if anything would be different if the diagnosis was made right away.Most likely the reverse Hill-Sachs lesion was already present and for that matter the treatment options we discussed in terms of addressing the reverse Hill-Sachs lesion probably would not have been different.  Because of the fact that the situation has been chronic going on longer than three months, no immediate decision has to be made. He'll contact our surgery scheduler if he decided to correlate with the surgery next week or two. In the meantime he was discouraged in terms of trying to move the right shoulder..      Imaging Interpretation:     CT RIGHT SHOULDER WITHOUT CONTRAST 3/19/2018 8:12 AM      HISTORY:  Acute pain of right shoulder. Tear of right rotator cuff,  unspecified tear extent.     TECHNIQUE: Axial scans were performed through the right shoulder with  sagittal construction. Radiation dose for this scan was reduced using  automated exposure control, adjustment of the mA and/or kV according  to patient size, or iterative reconstruction technique.     COMPARISON: X-ray from 3/14/2018.     FINDINGS: There is a posterior right shoulder dislocation. The humeral  head is impacted on the posterior rim of the glenoid with an impaction  fracture at the articular margin of the humeral head.     There is a mildly comminuted moderately displaced fracture of the mid  right clavicle.         IMPRESSION:  1. Impacted posterior dislocation of the humeral head.  There is a  small impaction fracture at the articular margin of the humeral head.  2. Mildly comminuted moderately displaced fracture of the mid right  clavicle.     MIGUEL PADILLA MD  SHOULDER RIGHT THREE OR MORE VIEWS 3/14/2018 3:23 PM      HISTORY: 3 months post injury. Acute pain of right shoulder     COMPARISON: 12/14/2017         IMPRESSION: Mildly displaced oblique fracture distal clavicular shaft  without significant change. No definite callus is seen to indicate  healing. There is posterior dislocation of the glenohumeral joint. No  acute fracture.     [Access Center: Posterior dislocation right glenohumeral joint.]     This report will be copied to the [facility] to ensure a  provider acknowledges the finding. Access Center is available Monday  through Friday 8am-3:30 pm.      ASHLEY ABRAHAM MD  CLAVICLE RIGHT TWO VIEWS   12/14/2017 1:59 PM      HISTORY: Trauma, shoulder pain. Shoulder injury, right, initial  encounter.     COMPARISON: 12/14/2017         IMPRESSION: Displaced oblique fracture is noted in the mid right  clavicle. The proximal fracture fragment is displaced in a cranial  direction relative to distal fracture fragment measuring approximately  1 cm. Acromioclavicular articulation appears intact. Poor assessment  of the glenohumeral articulation.     NACHO DON MD    SHOULDER RIGHT THREE OR MORE VIEWS  12/14/2017 1:50 PM      HISTORY: Ski accident, shoulder pain. Shoulder injury, right, initial  encounter.     COMPARISON: None.         IMPRESSION: Oblique fracture in the mid clavicle, better visualized on  clavicular radiographs. Glenohumeral articulation appears intact.     MD Gabe MO MD  Department of Orthopedic Surgery        Disclaimer: This note consists of symbols derived from keyboarding, dictation and/or voice recognition software. As a result, there may be errors in the script that have gone undetected. Please consider this when interpreting  information found in this chart.

## 2018-03-21 NOTE — LETTER
3/21/2018         RE: Enoch Fortune Sr.  [address] [address]        Dear Colleague,    Thank you for referring your patient, Enoch Fortune Sr., to the [facility] ORTHOPEDIC SURGERY. Please see a copy of my visit note below.    HISTORY OF PRESENT ILLNESS:    Enoch Fortune Sr. is a 47 year old male who is seen in consultation at the request of Dr. Alvarez for right shoulder pain.  DOI: 12/14/17 ~ 3.5 months ago - He was skiing at [employer] and fell on the tip of his shoulder. He was then seen at urgent care for xray and diagnosed with a clavicle. Treated with a sling at that time.  Present symptoms: achy constant pain, AROM limited to shoulder height and below, some shooting pain down the arm. The episodes of shooting pain down the arm has gotten less frequent.  Right numbness or tingling sensation in the fingertips.He is right-hand dominant.      Treatments tried to this point: xrays, physical therapy, CT scan, has seen Dr. Alvarez, HEP, ice, ibuprofen  Orthopedic PMH: none     Past Medical History:   Diagnosis Date     NO ACTIVE PROBLEMS        Past Surgical History:   Procedure Laterality Date     NO HISTORY OF SURGERY         History reviewed. No pertinent family history.    Social History     Social History     Marital status:      Spouse name: N/A     Number of children: N/A     Years of education: N/A     Occupational History     Not on file.     Social History Main Topics     Smoking status: Never Smoker     Smokeless tobacco: Never Used     Alcohol use Not on file     Drug use: Not on file     Sexual activity: Not on file     Other Topics Concern     Not on file     Social History Narrative       Current Outpatient Prescriptions   Medication Sig Dispense Refill     ibuprofen (ADVIL/MOTRIN) 800 MG tablet Take 1 tablet (800 mg) by mouth every 8 hours as needed for moderate pain 60 tablet 0       No Known Allergies    REVIEW OF SYSTEMS:  CONSTITUTIONAL:  NEGATIVE for fever,  chills, change in weight  INTEGUMENTARY/SKIN:  NEGATIVE for worrisome rashes, moles or lesions  EYES:  NEGATIVE for vision changes or irritation  ENT/MOUTH:  NEGATIVE for ear, mouth and throat problems  RESP:  NEGATIVE for significant cough or SOB  BREAST:  NEGATIVE for masses, tenderness or discharge  CV:  NEGATIVE for chest pain, palpitations or peripheral edema  GI:  NEGATIVE for nausea, abdominal pain, heartburn, or change in bowel habits  :  Negative   MUSCULOSKELETAL:  See HPI above  NEURO:  NEGATIVE for weakness, dizziness or paresthesias  ENDOCRINE:  NEGATIVE for temperature intolerance, skin/hair changes  HEME/ALLERGY/IMMUNE:  NEGATIVE for bleeding problems  PSYCHIATRIC:  NEGATIVE for changes in mood or affect      PHYSICAL EXAM:  There were no vitals taken for this visit.  There is no height or weight on file to calculate BMI.   GENERAL APPEARANCE: healthy, alert and no distress   HEENT: No apparent thyroid megaly. Clear sclera with normal ocular movement  RESPIRATORY: No labored breathing  SKIN: no suspicious lesions or rashes  NEURO: Normal strength and tone, mentation intact and speech normal  VASCULAR: Good pulses, and capillary refill   LYMPH: no lymphadenopathy   PSYCH:  mentation appears normal and affect normal/bright    MUSCULOSKELETAL:  Normal gait  Mild atrophy of supraspinatus musculature, right compared to left  Axillary nerve sensory function is intact  Elbow range of motion is well maintained  Right shoulder range of motion is very limited with virtually no external rotation  Forward elevation is mostly scapulothoracic movement  Very weak isometric strength of internal rotation and external rotation  skin is intact  No swelling  No tenderness at the fracture site of right clavicle       SSESSMENT:  Chronic locked posterior dislocation, right shoulder  Healing right clavicle fracture    PLAN:  With improvement of pain and absence of tenderness at the fracture site, no further intervention  is necessary for the clavicle fracture.  We visualized images of the shoulder x-rays from March 14, 2018 and CT scan images of March 19, 2018.  Nature of posterior dislocation was explained.  Anticipated necessary treatment was explained.  He was informed that reversed Hill-Sachs lesion needs to be addressed either by  Transfer of Lesser tuberosity or allograft depending on the size. We also talked about the possibility of requiring a resurfacing option.   He was then informed of the fact that he needs to be immobilized with external rotation of the arm for 6 weeks. Most likely, this can be done as an outpatient. Potential risks of infection, loss of motion and neurovascular compromise from the surgery were explained.  He had a question about if anything would be different if the diagnosis was made right away.Most likely the reverse Hill-Sachs lesion was already present and for that matter the treatment options we discussed in terms of addressing the reverse Hill-Sachs lesion probably would not have been different.  Because of the fact that the situation has been chronic going on longer than three months, no immediate decision has to be made. He'll contact our surgery scheduler if he decided to correlate with the surgery next week or two. In the meantime he was discouraged in terms of trying to move the right shoulder..      Imaging Interpretation:     CT RIGHT SHOULDER WITHOUT CONTRAST 3/19/2018 8:12 AM      HISTORY:  Acute pain of right shoulder. Tear of right rotator cuff,  unspecified tear extent.     TECHNIQUE: Axial scans were performed through the right shoulder with  sagittal construction. Radiation dose for this scan was reduced using  automated exposure control, adjustment of the mA and/or kV according  to patient size, or iterative reconstruction technique.     COMPARISON: X-ray from 3/14/2018.     FINDINGS: There is a posterior right shoulder dislocation. The humeral  head is impacted on the posterior  rim of the glenoid with an impaction  fracture at the articular margin of the humeral head.     There is a mildly comminuted moderately displaced fracture of the mid  right clavicle.         IMPRESSION:  1. Impacted posterior dislocation of the humeral head. There is a  small impaction fracture at the articular margin of the humeral head.  2. Mildly comminuted moderately displaced fracture of the mid right  clavicle.     MIGUEL PADILLA MD  SHOULDER RIGHT THREE OR MORE VIEWS 3/14/2018 3:23 PM      HISTORY: 3 months post injury. Acute pain of right shoulder     COMPARISON: 12/14/2017         IMPRESSION: Mildly displaced oblique fracture distal clavicular shaft  without significant change. No definite callus is seen to indicate  healing. There is posterior dislocation of the glenohumeral joint. No  acute fracture.     [Access Center: Posterior dislocation right glenohumeral joint.]     This report will be copied to the [facility] to ensure a  provider acknowledges the finding. Access Center is available Monday  through Friday 8am-3:30 pm.      ASHLEY ABRAHAM MD  CLAVICLE RIGHT TWO VIEWS   12/14/2017 1:59 PM      HISTORY: Trauma, shoulder pain. Shoulder injury, right, initial  encounter.     COMPARISON: 12/14/2017         IMPRESSION: Displaced oblique fracture is noted in the mid right  clavicle. The proximal fracture fragment is displaced in a cranial  direction relative to distal fracture fragment measuring approximately  1 cm. Acromioclavicular articulation appears intact. Poor assessment  of the glenohumeral articulation.     NACHO DON MD    SHOULDER RIGHT THREE OR MORE VIEWS  12/14/2017 1:50 PM      HISTORY: Ski accident, shoulder pain. Shoulder injury, right, initial  encounter.     COMPARISON: None.         IMPRESSION: Oblique fracture in the mid clavicle, better visualized on  clavicular radiographs. Glenohumeral articulation appears intact.     MD Gabe MO MD  Department of  Orthopedic Surgery        Disclaimer: This note consists of symbols derived from keyboarding, dictation and/or voice recognition software. As a result, there may be errors in the script that have gone undetected. Please consider this when interpreting information found in this chart.      Again, thank you for allowing me to participate in the care of your patient.        Sincerely,        Gabe Lowe MD

## 2018-03-21 NOTE — PATIENT INSTRUCTIONS
After considering what we discussed, if The decision is made to go ahead with the surgery, Please let us know as soon as possible

## 2018-03-22 ENCOUNTER — TELEPHONE (OUTPATIENT)
Dept: ORTHOPEDICS | Facility: CLINIC | Age: 48
End: 2018-03-22

## 2018-03-22 NOTE — TELEPHONE ENCOUNTER
Patient calls stating he has seen both Dr. Lowe and Dr. Alvarez for a shoulder issue. He was advised he needs surgery. He would like to get a second opinion at [place]. He is wanting to have x-rays and CT results pushed to the PACS system. Phone number for medical records given to patient.     MAGGI Narvaez RN

## 2018-03-22 NOTE — TELEPHONE ENCOUNTER
Giselle from [employer] returned call. They are able to push images to [facility].  Because patient does not have a doctor there yet, they will fax a list to [facility]'s radiology department letting them know the images were pushed and patient waiting for review to make an appointment.     Left message for patient explaining the above and gave him the [employer] number to call for further questions.     MAGGI Narvaez RN

## 2018-03-22 NOTE — TELEPHONE ENCOUNTER
Patient called back and states he got transferred to multiple places and was told several different things.   He already picked up his images on a disc and medical records yesterday. He was not aware until today that [facility] wants the images pushed via the PACS system.   Informed will investigate further and call him back.   Ok to leave message.       Left voicemail for [facility]: [phone] to return call.     MAGGI Narvaez RN

## 2018-06-19 PROBLEM — M25.511 RIGHT SHOULDER PAIN: Status: RESOLVED | Noted: 2018-02-14 | Resolved: 2018-06-19
